# Patient Record
Sex: FEMALE | Race: WHITE | Employment: FULL TIME | URBAN - METROPOLITAN AREA
[De-identification: names, ages, dates, MRNs, and addresses within clinical notes are randomized per-mention and may not be internally consistent; named-entity substitution may affect disease eponyms.]

---

## 2016-01-20 LAB
CREATININE, EXTERNAL: 0.8
MICROALBUMIN UR TEST STR-MCNC: NORMAL MG/DL

## 2020-07-14 ENCOUNTER — DOCUMENTATION ONLY (OUTPATIENT)
Dept: ORTHOPEDIC SURGERY | Age: 55
End: 2020-07-14

## 2020-07-14 ENCOUNTER — OFFICE VISIT (OUTPATIENT)
Dept: ORTHOPEDIC SURGERY | Age: 55
End: 2020-07-14

## 2020-07-14 VITALS
RESPIRATION RATE: 16 BRPM | HEIGHT: 67 IN | HEART RATE: 70 BPM | TEMPERATURE: 97.5 F | SYSTOLIC BLOOD PRESSURE: 123 MMHG | DIASTOLIC BLOOD PRESSURE: 77 MMHG | WEIGHT: 177.6 LBS | BODY MASS INDEX: 27.88 KG/M2 | OXYGEN SATURATION: 99 %

## 2020-07-14 DIAGNOSIS — M19.172 POST-TRAUMATIC OSTEOARTHRITIS OF LEFT FOOT: ICD-10-CM

## 2020-07-14 DIAGNOSIS — M19.172 POST-TRAUMATIC OSTEOARTHRITIS OF LEFT ANKLE: Primary | ICD-10-CM

## 2020-07-14 DIAGNOSIS — M25.572 CHRONIC PAIN OF LEFT ANKLE: ICD-10-CM

## 2020-07-14 DIAGNOSIS — G89.29 CHRONIC PAIN OF LEFT ANKLE: ICD-10-CM

## 2020-07-14 NOTE — PROGRESS NOTES
Feeling better AMBULATORY PROGRESS NOTE      Patient: Maria Eugenia Harvey             MRN: [id number]     SSN: [id number] Body mass index is 27.82 kg/m². YOB: 1965     AGE: 47 y.o. EX: female    PCP: No primary care provider on file. IMPRESSION //  DIAGNOSIS AND TREATMENT PLAN      DIAGNOSES  1. Post-traumatic osteoarthritis of left ankle    2. Post-traumatic osteoarthritis of left foot    3. Chronic pain of left ankle        Orders Placed This Encounter    [18801] Ankle Min 3V     Order Specific Question:   Weight bearing? 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 She has limited motion, painful motion of the subtalar joint, holds her foot slightly everted and has tenderness the peroneal tendons as well. An MRI, as necessary, to assess the subtalar joint and the peroneal tendons on the posterior lateral portion of her left hindfoot. She also has, some mild plantar fasciitis as well. I am most concerned about the lateral hindfoot. PLAN:    1. I will order a left ankle MRI without contrast to further assess the peroneal tendons and subtalar joint    RTO- following MRI      HPI //  [facility] [address] José IS A 47 y.o. female who presents to my outpatient office for evaluation of: pain in the left heel. When she was younger she was very active and reports a history of multiple ankle sprains. She also used to play tennis and indicates an episode of ankle swelling years ago.  At that time she went to a doctor who indicated it was sprained and provided her with a brace. She was also told she has plantar fasciitis. She has used the brace ever since and reports her pain has progressively gotten worse overtime. She also experience start up pain and reports significant swelling by the end of the day. She has pain, also at night, when she sleeps in the scissored position, right side down left side up, she describes having pain, to the lateral and posterior lateral portion of left hindfoot at the peroneal tendons and subtalar joint regions. As a young child, she reports having frequent ankle sprains. Visit Vitals  /77 (BP 1 Location: Left arm, BP Patient Position: Sitting)   Pulse 70   Temp 97.5 °F (36.4 °C) (Oral)   Resp 16   Ht 5' 7\" (1.702 m)   Wt 177 lb 9.6 oz (80.6 kg)   SpO2 99%   BMI 27.82 kg/m²       ANKLE/FOOT left    Psychiatry: Alert, oriented x 3 (name,place,time of day); speech normal in context and clarity, memory intact grossly, no involuntary movements - tremors, no dementia  Gait: normal  Tenderness: moderate tenderness over the inferior medial plantar fasciitis, posterior lateral ankle, and subtalar joint. Significant tenderness of the peroneal tendons in the retro fibula groove. She is tenderness the subtalar joint laterally, does not want a fully invert, has incomplete motion, and I could not really get her towards to go beyond neutral, because of really exquisite tenderness in the subtalar joint. Cutaneous: some mild to moderate effusion of the left ankle specifically, at the peroneal tendons posterior laterally  Joint Motion: significant pain with attempted eversion  Joint / Tendon Stability: No Ankle or Subtalar instability or joint laxity. No peroneal sublux ability or dislocation  Alignment: Normal alignment is seen just with slight eversion at the hindfoot. When she standing.   Neuro Motor/Sensory: NL/NL,  Vascular: NL foot/ankle pulses,   Lymphatics: No extremity lymphedema, No calf swelling, no tenderness to calf muscles. CHART REVIEW     There is no problem list on file for this patient. Valerie Coats has been experiencing pain and discomfort confirmed as outlined in the pain assessment outlined below. Pain Assessment  7/14/2020   Location of Pain Ankle   Location Modifiers Left   Severity of Pain 4   Quality of Pain Throbbing; Sharp;Dull;Aching;Burning   Quality of Pain Comment sweeling   Duration of Pain A few hours   Frequency of Pain Intermittent   Aggravating Factors Walking;Standing;Stairs   Relieving Factors Ice;Rest   Result of Injury No        Precious Vega  has no past medical history on file. 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 History reviewed. No pertinent past medical history. History reviewed. No pertinent surgical history. No current outpatient medications on file. No current facility-administered medications for this visit. Allergies   Allergen Reactions    Codeine Unknown (comments)    Shrimp Not Reported This Time     Shrimp shells is what the patient is allergic to       Social History     Occupational History    Not on file   Tobacco Use    Smoking status: Never Smoker    Smokeless tobacco: Never Used   Substance and Sexual Activity    Alcohol use: Not on file    Drug use: Not on file    Sexual activity: Not on file     History reviewed. No pertinent family history. THE  FOR Haydee Marc MD 7/14/2020 . DIAGNOSTIC IMAGING  LAB DATA      No results found for: HBA1C, HGBE8, LDE1NGTA, GXC6ZFEB // No results found for: GLU, GLUCPOC     No results found for: DVY2KMVX, MQO2HZZX      No results found for: VITD3, XQVID2, XQVID3, XQVID, VD3RIA, ZQSJ31OFRUA      REVIEW OF SYSTEMS : 7/14/2020  ALL BELOW ARE Negative except : SEE HPI        CONSTITUTIONAL: No weight loss.    PSYCHOLOGICAL : No Feelings of anxiety, depression, agitation  EYES: No blurred vision and no eye discharge. NO eye pain, double vision  ENT: No nasal discharge. No ear pain. CARDIOVASCULAR: No chest pain and no diaphoresis. RESPIRATORY: No cough, no hemoptysis. GI: No vomiting, no diarrhea   : No urinary frequency and no dysuria. MUSCULOSKELETAL: see HPI  SKIN: No rashes. NEURO:  No dizziness,weakness, headaches// No visual changes or confusion, or seizures,   ENDOCRINE: No polyphagia and no polydipsia. HEMATOLOGY: No bleeding tendencies. DIAGNOSTIC IMAGING        ANKLE X RAYS 3 VIEWS Left   X RAYS AT [facility]  7/14/2020    NON WEIGHT BEARING    X RAYS AT [address] [place] [address]  7/14/2020    Bones: No fractures or dislocations. No focal osteolytic or osteoblastic process     Bone Spurs: No significant bone spurs  Alignment: Ankle mortise alignment is congruent, Tibial plafond and talar dome intact. No Osteochondral defects seen   Joint: The subtalar joint, is not well visualized, there are some joint space narrowing, and I suspect some OA, of the left subtalar joint. There may be a subtalar joint coalition, as well. Her fibula slightly shortened and the lateral process of her talus, extends outward, and is remains more prominent or more protruding. Soft Tissues: Normal, No radiopaque foreign body     No abnormal calcific densities to soft tissues    No ankle joint effusion in lateral projection. Mineralization: Suggests no Osteopenia    I have personally reviewed the results of the above study. The interpretation of this study is my professional opinion     . Otoniel Pastor MD  7/14/2020  7:24 AM       Disclaimer: Sections of this note are dictated using utilizing voice recognition software, which may have resulted in some phonetic based errors in grammar and contents. Even though attempts were made to correct all the mistakes, some may have been missed, and remained in the body of the document. If questions arise, please contact our department.

## 2020-07-14 NOTE — PROGRESS NOTES
Patients order, office notes and demographics were faxed to [facility] [place] requesting an appt per patients request   Tel# [phone] fax# [phone] (radiology dept)

## 2020-07-28 ENCOUNTER — CLINICAL SUPPORT (OUTPATIENT)
Dept: FAMILY MEDICINE CLINIC | Age: 55
End: 2020-07-28
Payer: COMMERCIAL

## 2020-07-28 VITALS — SYSTOLIC BLOOD PRESSURE: 112 MMHG | HEART RATE: 54 BPM | DIASTOLIC BLOOD PRESSURE: 73 MMHG | TEMPERATURE: 97.5 F

## 2020-07-28 DIAGNOSIS — J30.2 SEASONAL ALLERGIC RHINITIS, UNSPECIFIED TRIGGER: Primary | ICD-10-CM

## 2020-07-28 DIAGNOSIS — Z51.6 DESENSITIZATION TO ALLERGENS: ICD-10-CM

## 2020-07-28 PROCEDURE — 95117 IMMUNOTHERAPY INJECTIONS: CPT | Performed by: NURSE PRACTITIONER

## 2020-07-28 NOTE — PROGRESS NOTES
Patient presented to office today for allergy injection. Patient stated She  did not have any adverse reaction to two months ago. Patient received 0.5ml of allergy IM to bilateral subcutaneous deltoid. She tolerated procedure well. Patient left office with out waiting the 15 minutes for us to observe. Lot Number [id number]  Expires 10/4/2020   Manufacture: mixed and ordered by Dr. Magda Calderon  NDC: Mixed and ordered by Dr. Magda Calderon  Dosage: 0.5   left Subcutaneous Deltoid      Lot Number [id number]  Expires 10/4/2020   Manufacture: mixed and ordered by Dr. Magda Calderon  NDC: Mixed and ordered by Dr. Magda Calderon  Dosage: 0.5  Right Subcutaneous Deltoid    Order placed for allergy injections (monthly), # 0.5 ml x 2, sub deltoid, per Verbal Order from [facility] on 7/28/2020 due to Seasonal Allergies.

## 2020-07-29 DIAGNOSIS — G89.29 CHRONIC PAIN OF LEFT ANKLE: ICD-10-CM

## 2020-07-29 DIAGNOSIS — M19.172 POST-TRAUMATIC OSTEOARTHRITIS OF LEFT ANKLE: ICD-10-CM

## 2020-07-29 DIAGNOSIS — M25.572 CHRONIC PAIN OF LEFT ANKLE: ICD-10-CM

## 2020-09-03 ENCOUNTER — CLINICAL SUPPORT (OUTPATIENT)
Dept: FAMILY MEDICINE CLINIC | Age: 55
End: 2020-09-03

## 2020-09-03 VITALS
SYSTOLIC BLOOD PRESSURE: 118 MMHG | DIASTOLIC BLOOD PRESSURE: 77 MMHG | HEART RATE: 78 BPM | OXYGEN SATURATION: 100 % | BODY MASS INDEX: 27 KG/M2 | TEMPERATURE: 98 F | HEIGHT: 67 IN | WEIGHT: 172 LBS

## 2020-09-03 DIAGNOSIS — J30.2 SEASONAL ALLERGIC RHINITIS, UNSPECIFIED TRIGGER: Primary | ICD-10-CM

## 2020-09-14 ENCOUNTER — OFFICE VISIT (OUTPATIENT)
Dept: ORTHOPEDIC SURGERY | Age: 55
End: 2020-09-14

## 2020-09-14 VITALS
WEIGHT: 176.6 LBS | SYSTOLIC BLOOD PRESSURE: 114 MMHG | BODY MASS INDEX: 27.72 KG/M2 | RESPIRATION RATE: 18 BRPM | HEIGHT: 67 IN | OXYGEN SATURATION: 98 % | DIASTOLIC BLOOD PRESSURE: 78 MMHG | HEART RATE: 72 BPM | TEMPERATURE: 98.6 F

## 2020-09-14 DIAGNOSIS — M19.172 POST-TRAUMATIC OSTEOARTHRITIS OF LEFT ANKLE: Primary | ICD-10-CM

## 2020-09-14 RX ORDER — ACETAMINOPHEN/DIPHENHYDRAMINE 500MG-25MG
TABLET ORAL
COMMUNITY

## 2020-09-14 NOTE — PATIENT INSTRUCTIONS
You have been provided with an order for durable medical equipment that you may  at an outside facility as our office does not carry the equipment you need. You may pick it up at any medical supply company you like. Listed below are a few different locations for your convenience: 
 
[address] [facility] Phone: [phone] [place]:  
[address] [facility], [address] [place], [address] [place]: 
Joselin Nowak [address] [place]: 
[address] [facility], [address] [employer] Address: [address], [place], [address] Phone: [phone] Look into [employer] 928 shoes as these shoes fit well with the [facility]

## 2020-09-14 NOTE — PROGRESS NOTES
Feeling better AMBULATORY PROGRESS NOTE      Patient: Yoandy Pitts             MRN: [id number]     SSN: [id number] Body mass index is 27.66 kg/m². YOB: 1965     AGE: 54 y.o. EX: female    PCP: Tiff Hall NP       IMPRESSION //  DIAGNOSIS AND TREATMENT PLAN      DIAGNOSES  1. Post-traumatic osteoarthritis of left ankle        Orders Placed This Encounter    Generic Supply Order     custom L extended SMO. Mild medial posting    diphenhydrAMINE-acetaminophen (Tylenol PM Extra Strength)  mg tab     Sig: Take  by mouth.  MELATONIN PO     Sig: Take  by mouth. 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 There are some OA changes seen across her ankle joint, as well. She had a MRI, left ankle, at [facility] [place], as this was conducted on July 25, 2020. This study showed small ankle effusion, partial-thickness chondral loss is noted across the medial and lateral talar shoulders with small subchondral bone marrow edema. No fractures no contusions. Is intact talar dome symmetric ankle mortise. The hindfoot, however showed advanced chondral loss across the posterior facet subtalar joint with with associated degenerative changes. There is accompanied subchondral marrow bone marrow edema at the subtalar joint posterior facet. The small subchondral cystic changes seen also across the subtalar region. No evidence of subtalar coalition. The midfoot, show no fracture no contusion there is mild chondral loss across the tarsometatarsal joint articulations. The remaining TMT joints were normal in appearance. The sinus Tarsi, showed subtotal replacement of normal fat within the sinus Tarsi, plantar fascia was of normal caliber and signal, the tarsal tunnel was normal in appearance, the regional soft tissues are also within normal limits no abnormal fluid collections. There is a prominent os trigonum. Impression was advanced arthritic change across the left left subtalar joint and to lesser extent the medial lateral shoulders of the the left ankle tib talar joint articulation with intact talar dome and evidence of prior ankle sprains. Small ankle effusion. Treatment options include surgical //  a subtalar joint arthrodesis bone grafting extra bone stimulator. The nonsurgical treatment options, would include a brace: An extended SMO brace to control her subtalar region. She did not want surgical invention, at this current time. So we will try the brace. I would recommend a repeat cortisone injections, in this region, although cortisone injection singular course injection may provide her some benefit may buy her some time but eventually things require subtalar arthrodesis which explained her she understands this. Sang Quintero MD, has ordered a custom brace or DME product for you. This will be customized and made for you by an outside facility. I am requesting that you contact the Orthotist company provided below in order to have the prescription made. Zachary Justice is in need of CUSTOM   Left     1. Unilateral, extended,  MEDIAL POSTED, SMO Left    2. GOAL OF TREATMENT TO: to provide M/L stability, optimal arch support, and limit ML/AP motion. Zachary Justice needs tri planar control (Medial / Lateral stability, optimal arch support, and limited Medial/Lateral // Anterior/Posterior Motion) of the foot and ankle in order to improve anatomical alignment, protect/control motion, and to relieve pain. PLAN:    1. DME order: custom L extended SMO. Mild medial posting ()  2. Look into New Balance 928 shoes as these shoes fit well with the SMO    RTO-  6 weeks      HPI //  Mckinley Eusebio Ulloa IS A 54 y.o. female who presents to my outpatient office for follow up evaluation of: Post-traumatic osteoarthritis of left ankle.  At the last OV, I ordered an MRI of the left ankle to assess peroneal tendons. Since the last Rissa Claudia continues to endorse the same persistent pain. 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     Visit Vitals  /78 (BP 1 Location: Left arm, BP Patient Position: Sitting)   Pulse 72   Temp 98.6 °F (37 °C)   Resp 18   Ht 5' 7\" (1.702 m)   Wt 176 lb 9.6 oz (80.1 kg)   SpO2 98%   BMI 27.66 kg/m²       ANKLE/FOOT left    Psychiatry: Alert, oriented x 3 (name,place,time of day); speech normal in context and clarity, memory intact grossly, no involuntary movements - tremors, no dementia  Gait: normal  Tenderness: moderate tenderness posterior facet subtalar joint. Slight tenderness of the peroneal tendons in the retro fibula groove. She is tenderness the subtalar joint laterally, does not want a fully invert, has incomplete motion, and I could not really get her towards to go beyond neutral, because of really exquisite tenderness in the subtalar joint. Cutaneous: some mild to moderate effusion of the left ankle specifically, at the peroneal tendons posterior laterally  Joint Motion: significant pain with attempted eversion  Joint / Tendon Stability: No Ankle or Subtalar instability or joint laxity. No peroneal sublux ability or dislocation  Alignment: Normal alignment is seen just with slight eversion at the hindfoot. When she standing. Neuro Motor/Sensory: NL/NL,  Vascular: NL foot/ankle pulses,   Lymphatics: No extremity lymphedema, No calf swelling, no tenderness to calf muscles. CHART REVIEW     Patient Active Problem List   Diagnosis Code    Seasonal allergic rhinitis J30.2        Chantel Watts has been experiencing pain and discomfort confirmed as outlined in the pain assessment outlined below. Pain Assessment  9/14/2020   Location of Pain Ankle   Location Modifiers Right   Severity of Pain 0   Quality of Pain Sharp; Throbbing   Quality of Pain Comment - Duration of Pain Persistent   Duration of Pain Comment when walking pain is persistent   Frequency of Pain Constant   Date Pain First Started (No Data)   Aggravating Factors Walking;Stairs   Limiting Behavior Yes   Relieving Factors Rest;NSAID   Result of Injury No        Precious Vega  has no past medical history on file. 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 History reviewed. No pertinent past medical history. History reviewed. No pertinent surgical history. Current Outpatient Medications   Medication Sig    diphenhydrAMINE-acetaminophen (Tylenol PM Extra Strength)  mg tab Take  by mouth.  MELATONIN PO Take  by mouth. No current facility-administered medications for this visit. Allergies   Allergen Reactions    Codeine Unknown (comments)    Shrimp Not Reported This Time     Shrimp shells is what the patient is allergic to       Social History     Occupational History    Not on file   Tobacco Use    Smoking status: Never Smoker    Smokeless tobacco: Never Used   Substance and Sexual Activity    Alcohol use: Yes     Alcohol/week: 1.0 standard drinks     Types: 1 Glasses of wine per week     Comment: occasionally    Drug use: Never    Sexual activity: Not on file     Family History   Problem Relation Age of Onset    Diabetes Mother     Hypertension Mother    Batista Obesity Mother     COPD Father     Cancer Father     Hypertension Father        THE  Ke Appiah MD 9/14/2020 . DIAGNOSTIC IMAGING  LAB DATA      No results found for: HBA1C, HGBE8, DRN0FEKR, ZQB6NWBZ // No results found for: GLU, GLUCPOC     No results found for: RXM4CQUD, IHL4AFSI      No results found for: VITD3, XQVID2, XQVID3, XQVID, VD3RIA, [id number]      REVIEW OF SYSTEMS : 9/14/2020  ALL BELOW ARE Negative except : SEE HPI        CONSTITUTIONAL: No weight loss.    PSYCHOLOGICAL : No Feelings of anxiety, depression, agitation  EYES: No blurred vision and no eye discharge. NO eye pain, double vision  ENT: No nasal discharge. No ear pain. CARDIOVASCULAR: No chest pain and no diaphoresis. RESPIRATORY: No cough, no hemoptysis. GI: No vomiting, no diarrhea   : No urinary frequency and no dysuria. MUSCULOSKELETAL: see HPI  SKIN: No rashes. NEURO:  No dizziness,weakness, headaches// No visual changes or confusion, or seizures,   ENDOCRINE: No polyphagia and no polydipsia. HEMATOLOGY: No bleeding tendencies. DIAGNOSTIC IMAGING          No notes on file    Please see above section of this report. I have personally reviewed the results of the above study. The interpretation of this study is my professional opinion. Written by [facility] , as dictated by Dr. Rosie Jackson. I, Dr. Rosie Jackson, confirm that all documentation is accurate.

## 2020-09-29 VITALS
OXYGEN SATURATION: 99 % | SYSTOLIC BLOOD PRESSURE: 129 MMHG | HEART RATE: 75 BPM | RESPIRATION RATE: 16 BRPM | HEIGHT: 67 IN | DIASTOLIC BLOOD PRESSURE: 83 MMHG

## 2020-09-29 RX ORDER — CYCLOBENZAPRINE HCL 10 MG
TABLET ORAL
COMMUNITY

## 2020-10-06 ENCOUNTER — CLINICAL SUPPORT (OUTPATIENT)
Dept: FAMILY MEDICINE CLINIC | Age: 55
End: 2020-10-06
Payer: COMMERCIAL

## 2020-10-06 VITALS
SYSTOLIC BLOOD PRESSURE: 121 MMHG | DIASTOLIC BLOOD PRESSURE: 83 MMHG | BODY MASS INDEX: 26.93 KG/M2 | HEIGHT: 67 IN | HEART RATE: 92 BPM | OXYGEN SATURATION: 98 % | TEMPERATURE: 97.3 F | WEIGHT: 171.6 LBS

## 2020-10-06 DIAGNOSIS — Z51.6 DESENSITIZATION TO ALLERGENS: ICD-10-CM

## 2020-10-06 DIAGNOSIS — J30.9 ALLERGIC RHINITIS, UNSPECIFIED SEASONALITY, UNSPECIFIED TRIGGER: ICD-10-CM

## 2020-10-06 PROCEDURE — 95117 IMMUNOTHERAPY INJECTIONS: CPT | Performed by: FAMILY MEDICINE

## 2020-10-06 NOTE — PROGRESS NOTES
Patient presented to office today for allergy injection. Patient stated She  did not have any adverse reaction to last injection on 9/3/2020. Patient received 1ml of allergy IM to bilateral subcutaneous deltoid. She tolerated procedure well. Patient refused to stay in the office for observation, was in no distress when they left. RX/Lot Number [id number]  Expires 10/04/2020 - patient stated that Dr. Munoz [place] office knew her medication was  but stated okay to give. Mixed Product by: Dr. Leslie Douglass  Dosage: 0.5   left Subcutaneous deltoid    RX/Lot Number [id number]  Expires 10/04/2020 - patient stated that Dr. Munoz [place] office knew her medication was  but stated okay to give   Mixed Product by: Dr. Leslie Douglass  Dosage: 0.5 ml   right Subcutaneous deltoid    Verbal Order from Dr. Eduardo Decker on 10/6/2020 due to allergies.

## 2020-10-06 NOTE — PATIENT INSTRUCTIONS
Antígeno de la alergia (Por inyección) Se [place] para tratar Samule Mail al hacer que montgomery cuerpo sea menos sensible a la sustancia a la cual usted es alérgico. Después de usar montse medicamento sreekanth cierto tiempo, es posible que usted no presente jennifer reacción alérgica juanita, si la presenta, esta reacción será menos severa. Montse [employer] se [place] para hacer pruebas de alergia. Gricelda(s) : Hymenoptera Venom Diagnostic Kit, Mixed Vespid Treatment, Wasp Treatment, Yellow Hornet Treatment, Yellow Jacket Treatment Existen muchas otras marcas de Dueñas. Montse medicamento no debe ser usado cuando:  
Usted no debe usar montse medicamento si no puede cumplir las reglas de montgomery plan de noble. Es necesario que usted pueda lidiar con los efectos secundarios de montse medicamento y [employer] problemas a montgomery médico. Es posible que no pueda usar montse medicamento si usted tiene ciertos problemas de [place]. No olvide informarle a montgomery médico, si usted tiene jennifer enfermedad severa en el corazón o problemas con montgomery sistema inmunológico. Tampoco olvide informarle, si [employer] un beta bloqueador, bora atenolol, carvedilol, labetalol, metoprolol, nadolol, propranolol, timolol, Blocadren®, Coreg®, Inderal®, Lopressor®, Tenormin® o Toprol®. Forma de usar montse medicamento:  
Inyectable · Antes de comenzar el tratamiento con montse medicamento, usted debe hacerse pruebas de alergia en montgomery piel. · Montgomery médico le recetará montgomery dosis exacta y le indicará la frecuencia con la que debe administrarse. Montse medicamento se administra en forma de inyección inmediatamente debajo de la piel. Montse medicamento es usualmente aplicado en la parte superior de montgomery brazo. · Sandre Corns u otro médico le administrará montse medicamento.  
· Si sufre de [facility] a más de [place], fifi puede necesitar más de jennifer inyección en cada visita que fifi skyla al consultorio de montgomery médico o clínica. Puede ser necesario que usted espere 30 minutos entre jennifer inyección y [place]. · Necesitará permanecer allí hasta 30 minutos después que le apliquen la inyección. · Antes de recibir jennifer inyección, infórmele siempre a montgomery médico lo siguiente: ¨ Si usted aquino tenido [place] reacción a la inyección para la alergia que le aplicaron antes. ¨ Si recientemente usted ha omitido alguna inyección. ¨ Si está presentando síntomas de [place], aún de jennifer alergia que no skyla parte de [place]. ¨ Si usted está enfermo o tiene Francis Edmonds, juanita en especial, si también tiene fiebre. ¨ Si recientemente, usted ha sido nain por algún insecto. · Usted recibirá lentamente dosis cada vez más grandes hasta que llegue a la dosis de sostenimiento. Jennifer vez que haya llegado a esta dosis de sostenimiento, usted comenzará a necesitar inyecciones con menos frecuencia. Muchas personas continúan recibiendo [employer] dosis de sostenimiento. Si jennifer dosis es olvidada:  
· Llame a montgomery médico o a la clínica donde recibe el tratamiento para solicitar instrucciones. · Lindsay medicamento debe aplicarse frecuentemente. Si omite muchas citas sreekanth el tratamiento, usted se demorará más tiempo en terminar montgomery tratamiento. Medicamentos y [employer] que debe evitar:  
Consulte con montgomery médico o farmacéutico antes de usar cualquier medicamento, incluyendo los que compra sin receta médica, las vitaminas y los productos herbales. · Tampoco olvide informarle, si [employer] un beta bloqueador, bora atenolol, carvedilol, labetalol, metoprolol, nadolol, propranolol, timolol, Blocadren®, Coreg®, Inderal®, Lopressor®, Tenormin® o Toprol®. Los beta bloqueadores pueden usarse para tratar ciertas condiciones, incluyendo presión arterial kelsie, problemas en el corazón o migrañas (jaquecas). Precauciones sreekanth el uso de [place] medicamento: · Asegúrese de informar a montgomery médico si usted está embarazada o amamantando. · Lindsay medicamento contiene un alergeno (sustancia a la cual usted es alérgico). A usted le administrarán lindsay alergeno en pequeñas cantidades, de nolan forma que montgomery cuerpo se acostumbrará poco a poco a esta sustancia. [place] significa que usted tendrá muy poco riesgo de presentar jennifer reacción alérgica al medicamento. Antes que usted comience con el tratamiento, hable con montgomery médico acerca de [employer]. · Aprenda a conocer los signos y síntomas de Lorel Pickler reacción alérgica que necesita ser tratada. Infórmele a montgomery médico, cada vez que piense que usted está presentando jennifer reacción alérgica severa a lindsay medicamento. Algunos de los signos de jennifer reacción alérgica severa, pueden ser los siguientes: ¨ Mareos, desvanecimientos o desmayo. ¨ Latido cardíaco acelerado, lento, o violento. ¨ Ronchas, picazón o salpullido en la piel. ¨ Estornudos, tos, ojos llorosos, flujo nasal o nariz congestionada. ¨ Hinchazón alrededor de montgomery boca o dentro de montgomery boca o garganta. ¨ Sibilancias u otros problemas respiratorios. · Consulte con montgomery médico sobre lo que debería hacer, si después de salir del consultorio de montgomery médico, usted presenta jennifer reacción alérgica. Algunas personas necesitan llevar siempre un medicamento especial (bora epinefrina, EpiPen®), para tratarse ellas mismas, en angie que presenten jennifre reacción alérgica. · Jcaque todas las personas presentan enrojecimiento, calor, dolor leve o hinchazón moderada en el sitio donde fue aplicada la inyección. Pregúntele a montgomery médico lo que usted debería hacer en angie de presentar esta reacción. Muchas personas se aplican hielo o rhett un antihistamínico, juanita montgomery situación podría ser diferente. · Un número reducido de personas presentará algún tipo de reacción alérgica, aún después de hina sido tratadas con lindsay medicamento. Sin embargo, esta reacción alérgica puede ser menos severa y más fácil de tratar de lo que hubiera sido antes del tratamiento. Efectos secundarios que pueden presentarse sreekanth el uso de montse medicamento:  
Consulte inmediatamente con el médico si nota cualquiera de estos efectos secundarios: 
· Reacción alérgica: Comezón o ronchas, hinchazón del aminah o las noe, hinchazón u hormigueo en la boca o garganta, opresión en el pecho, dificultad para respirar · Mareos, desvanecimientos o desmayo. · Latido cardíaco acelerado, lento, o violento. Consulte con el médico si nota otros efectos secundarios que janis son causados por montse medicamento. Llame a montgomery médico para consultarle Analisa. Usted puede notificar herve efectos secundarios al FDA al [phone]. © 2017 [facility] Information is for End User's use only and may not be sold, redistributed or otherwise used for commercial purposes. Esta información es sólo para uso en educación. Montgomery intención no es darle un consejo médico sobre enfermedades o tratamientos. Colsulte con montgomery Belenda Gun farmacéutico antes de seguir cualquier régimen médico para saber si es seguro y efectivo para usted.

## 2021-01-25 ENCOUNTER — OFFICE VISIT (OUTPATIENT)
Dept: ORTHOPEDIC SURGERY | Age: 56
End: 2021-01-25
Payer: COMMERCIAL

## 2021-01-25 VITALS
DIASTOLIC BLOOD PRESSURE: 67 MMHG | HEIGHT: 67 IN | HEART RATE: 71 BPM | WEIGHT: 177 LBS | TEMPERATURE: 98 F | OXYGEN SATURATION: 96 % | BODY MASS INDEX: 27.78 KG/M2 | SYSTOLIC BLOOD PRESSURE: 109 MMHG | RESPIRATION RATE: 16 BRPM

## 2021-01-25 DIAGNOSIS — M19.172 POST-TRAUMATIC OSTEOARTHRITIS OF LEFT FOOT: ICD-10-CM

## 2021-01-25 DIAGNOSIS — M19.172 POST-TRAUMATIC OSTEOARTHRITIS OF LEFT ANKLE: Primary | ICD-10-CM

## 2021-01-25 PROCEDURE — 99213 OFFICE O/P EST LOW 20 MIN: CPT | Performed by: ORTHOPAEDIC SURGERY

## 2021-01-25 NOTE — PROGRESS NOTES
Feeling better AMBULATORY PROGRESS NOTE      Patient: Jose Patterson             MRN: [id number]     SSN: [id number] Body mass index is 27.72 kg/m². YOB: 1965     AGE: 54 y.o. EX: female    PCP: Martha Kumar NP       IMPRESSION //  DIAGNOSIS AND TREATMENT PLAN      DIAGNOSES  1. Post-traumatic osteoarthritis of left ankle    2. Post-traumatic osteoarthritis of left foot        No orders of the defined types were placed in this encounter. PLAN:    1. Continue wearing custom SMO in tennis shoes    RTO-  4 months, as she is doing a much better at this point in time. She has had numerous adjustments, to orthotics and she is quite pleased with the with the attention to detail from Sharon Dave at [address] and prosthetics. HPI //  Mckinley Eusebio Ulloa IS A 54 y.o. female who presents t  my outpatient office for follow up evaluation of: Post-traumatic osteoarthritis of left ankle. At the last OV, I wrote a Rx for a custom L extended SMO with mild medial posting and encouraged pt to look into New Balance 928 shoes. Since the last OV, Jose Patterson reports no major pain in her left ankle since she started wearing the custom SMO. She continues to endorse persistent swelling, but otherwise she is able to walk better with less pain. She admits that is  not 100% but is able to perform most of her normal activities at about 50% capacity. Occasionally she will have sharp arthritic pain at night. She finds comfort with wearing croc-style shoes around the house and without the brace and has no major pain.       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     Visit Vitals  /67 (BP 1 Location: Right arm, BP Patient Position: Sitting)   Pulse 71   Temp 98 °F (36.7 °C) (Temporal)   Resp 16   Ht 5' 7\" (1.702 m)   Wt 177 lb (80.3 kg)   SpO2 96%   BMI 27.72 kg/m²       ANKLE/FOOT left    Psychiatry: Alert, oriented x 3 (name,place,time of day); speech normal in context and clarity, memory intact grossly, no involuntary movements - tremors, no dementia  Gait: normal  Tenderness: mild tenderness posterior facet subtalar joint. Slight tenderness of the peroneal tendons in the retro fibula groove. She is tenderness the subtalar joint laterally, does not want a fully invert, has incomplete motion, and I could not really get her towards to go beyond neutral, because of really exquisite tenderness in the subtalar joint. Cutaneous: some mild swelling specifically, at the peroneal tendons posterior laterally  Joint Motion: significant pain with attempted eversion  Joint / Tendon Stability: No Ankle or Subtalar instability or joint laxity. No peroneal sublux ability or dislocation  Alignment: Normal alignment is seen just with slight eversion at the hindfoot. When she standing. Neuro Motor/Sensory: NL/NL,  Vascular: NL foot/ankle pulses,   Lymphatics: No extremity lymphedema, No calf swelling, no tenderness to calf muscles. CHART REVIEW     Patient Active Problem List   Diagnosis Code    Seasonal allergic rhinitis J30.2        Drew Smith has been experiencing pain and discomfort confirmed as outlined in the pain assessment outlined below. Pain Assessment  1/25/2021   Location of Pain Ankle   Location Modifiers Left   Severity of Pain 0   Quality of Pain -   Quality of Pain Comment -   Duration of Pain -   Duration of Pain Comment -   Frequency of Pain -   Date Pain First Started -   Aggravating Factors -   Limiting Behavior No   Relieving Factors -   Result of Injury No        Precious Vega  has no past medical history on file. 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 History reviewed. No pertinent past medical history. History reviewed. No pertinent surgical history. Current Outpatient Medications   Medication Sig    MELATONIN PO Take  by mouth.     cyclobenzaprine (FLEXERIL) 10 mg tablet Take  by mouth three (3) times daily as needed for Muscle Spasm(s).  diphenhydrAMINE-acetaminophen (Tylenol PM Extra Strength)  mg tab Take  by mouth. No current facility-administered medications for this visit. Allergies   Allergen Reactions    Codeine Unknown (comments)    Shrimp Not Reported This Time     Shrimp shells is what the patient is allergic to       Social History     Occupational History    Not on file   Tobacco Use    Smoking status: Never Smoker    Smokeless tobacco: Never Used   Substance and Sexual Activity    Alcohol use: Yes     Alcohol/week: 1.0 standard drinks     Types: 1 Glasses of wine per week     Comment: occasionally    Drug use: Never    Sexual activity: Not on file     Family History   Problem Relation Age of Onset    Diabetes Mother     Hypertension Mother     Obesity Mother     COPD Father     Cancer Father     Hypertension Father     Immunodeficiency Father         ALS       THE  FOR Maria Teresa Tariq MD 1/25/2021 . DIAGNOSTIC IMAGING  LAB DATA      No results found for: HBA1C, HGBE8, BGJ6RBVI, BJJ4VQZY // No results found for: GLU, GLUCPOC     No results found for: YKK2HAQR, HXC6CJYH      No results found for: VITD3, XQVID2, XQVID3, XQVID, VD3RIA, [id number]      REVIEW OF SYSTEMS : 1/25/2021  ALL BELOW ARE Negative except : SEE HPI        CONSTITUTIONAL: No weight loss. PSYCHOLOGICAL : No Feelings of anxiety, depression, agitation  EYES: No blurred vision and no eye discharge. NO eye pain, double vision  ENT: No nasal discharge. No ear pain. CARDIOVASCULAR: No chest pain and no diaphoresis. RESPIRATORY: No cough, no hemoptysis. GI: No vomiting, no diarrhea   : No urinary frequency and no dysuria. MUSCULOSKELETAL: see HPI  SKIN: No rashes. NEURO:  No dizziness,weakness, headaches// No visual changes or confusion, or seizures,   ENDOCRINE: No polyphagia and no polydipsia.    HEMATOLOGY: No bleeding tendencies. DIAGNOSTIC IMAGING      No notes on file    Please see above section of this report. I have personally reviewed the results of the above study. The interpretation of this study is my professional opinion. Written by Jobie Baumgarten, as dictated by Dr. Gris Hooper. I, Dr. Gris Hooper, confirm that all documentation is accurate.

## 2021-06-08 ENCOUNTER — OFFICE VISIT (OUTPATIENT)
Dept: ORTHOPEDIC SURGERY | Age: 56
End: 2021-06-08
Payer: COMMERCIAL

## 2021-06-08 VITALS
WEIGHT: 177 LBS | HEART RATE: 79 BPM | TEMPERATURE: 97 F | BODY MASS INDEX: 27.78 KG/M2 | HEIGHT: 67 IN | OXYGEN SATURATION: 96 %

## 2021-06-08 DIAGNOSIS — M19.172 POST-TRAUMATIC OSTEOARTHRITIS OF LEFT FOOT: ICD-10-CM

## 2021-06-08 DIAGNOSIS — M19.172 POST-TRAUMATIC OSTEOARTHRITIS OF LEFT ANKLE: Primary | ICD-10-CM

## 2021-06-08 DIAGNOSIS — M17.11 ARTHRITIS OF RIGHT KNEE: ICD-10-CM

## 2021-06-08 PROCEDURE — 99213 OFFICE O/P EST LOW 20 MIN: CPT | Performed by: ORTHOPAEDIC SURGERY

## 2021-06-08 RX ORDER — ACETAMINOPHEN 500 MG
TABLET ORAL
COMMUNITY

## 2021-06-08 RX ORDER — IBUPROFEN 200 MG
CAPSULE ORAL AS NEEDED
COMMUNITY

## 2021-06-08 NOTE — PATIENT INSTRUCTIONS
Knee: Exercises Introduction Here are some examples of exercises for you to try. The exercises may be suggested for a condition or for rehabilitation. Start each exercise slowly. Ease off the exercises if you start to have pain. You will be told when to start these exercises and which ones will work best for you. How to do the exercises [facility] 1. Sit with your leg straight and supported on the floor or a firm bed. (If you feel discomfort in the front or back of your knee, place a small towel roll under your knee.) 2. Tighten the muscles on top of your thigh by pressing the back of your knee flat down to the floor. (If you feel discomfort under your kneecap, place a small towel roll under your knee.) 3. Hold for about 6 seconds, then rest for up to 10 seconds. 4. Do 8 to 12 repetitions several times a day. Straight-leg raises to the front 1. Lie on your back with your good knee bent so that your foot rests flat on the floor. Your injured leg should be straight. Make sure that your low back has a normal curve. You should be able to slip your flat hand in between the floor and the small of your back, with your palm touching the floor and your back touching the back of your hand. 2. Tighten the thigh muscles in the injured leg by pressing the back of your knee flat down to the floor. Hold your knee straight. 3. Keeping the thigh muscles tight, lift your injured leg up so that your heel is about 12 inches off the floor. Hold for about 6 seconds and then lower slowly. 4. Do 8 to 12 repetitions, 3 times a day. Straight-leg raises to the outside 1. Lie on your side, with your injured leg on top. 2. Tighten the front thigh muscles of your injured leg to keep your knee straight. 3. Keep your hip and your leg straight in line with the rest of your body, and keep your knee pointing forward. Do not drop your hip back.  
4. Lift your injured leg straight up toward the ceiling, about 12 inches off the floor. Hold for about 6 seconds, then slowly lower your leg. 5. Do 8 to 12 repetitions. Straight-leg raises to the back 1. Lie on your stomach, and lift your leg straight up behind you (toward the ceiling). 2. Lift your toes about 6 inches off the floor, hold for about 6 seconds, then lower slowly. 3. Do 8 to 12 repetitions. Straight-leg raises to the inside 1. Lie on the side of your body with the injured leg. 2. You can either prop your other (good) leg up on a chair, or you can bend your good knee and put that foot in front of your injured knee. Do not drop your hip back. 3. Tighten the muscles on the front of your thigh to straighten your injured knee. 4. Keep your kneecap pointing forward, and lift your whole leg up toward the ceiling about 6 inches. Hold for about 6 seconds, then lower slowly. 5. Do 8 to 12 repetitions. Heel dig bridging 1. Lie on your back with both knees bent and your ankles bent so that only your heels are digging into the floor. Your knees should be bent about 90 degrees. 2. Then push your heels into the floor, squeeze your buttocks, and lift your hips off the floor until your shoulders, hips, and knees are all in a straight line. 3. Hold for about 6 seconds as you continue to breathe normally, and then slowly lower your hips back down to the floor and rest for up to 10 seconds. 4. Do 8 to 12 repetitions. Hamstring curls 1. Lie on your stomach with your knees straight. If your kneecap is uncomfortable, roll up a washcloth and put it under your leg just above your kneecap. 2. Lift the foot of your injured leg by bending the knee so that you bring the foot up toward your buttock. If this motion hurts, try it without bending your knee quite as far. This may help you avoid any painful motion. 3. Slowly lower your leg back to the floor. 4. Do 8 to 12 repetitions.  
5. With permission from your doctor or physical therapist, you may also want to add a cuff weight to your ankle (not more than 5 pounds). With weight, you do not have to lift your leg more than 12 inches to get a hamstring workout. Shallow standing knee bends Do this exercise only if you have very little pain; if you have no clicking, locking, or giving way if you have an injured knee; and if it does not hurt while you are doing 8 to 12 repetitions. 1. Stand with your hands lightly resting on a counter or chair in front of you. Put your feet shoulder-width apart. 2. Slowly bend your knees so that you squat down like you are going to sit in a chair. Make sure your knees do not go in front of your toes. 3. Lower yourself about 6 inches. Your heels should remain on the floor at all times. 4. Rise slowly to a standing position. Heel raises 1. Stand with your feet a few inches apart, with your hands lightly resting on a counter or chair in front of you. 2. Slowly raise your heels off the floor while keeping your knees straight. 3. Hold for about 6 seconds, then slowly lower your heels to the floor. 4. Do 8 to 12 repetitions several times during the day. Follow-up care is a key part of your treatment and safety. Be sure to make and go to all appointments, and call your doctor if you are having problems. It's also a good idea to know your test results and keep a list of the medicines you take. Where can you learn more? Go to http://www.gray.com/ Enter L643 in the search box to learn more about \"Knee: Exercises. \" Current as of: November 16, 2020               Content Version: 12.8 © 2124-2563 [employer]. Care instructions adapted under license by [employer] (which disclaims liability or warranty for this information). If you have questions about a medical condition or this instruction, always ask your healthcare professional. [address] any warranty or liability for your use of this information.
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
## 2021-08-10 NOTE — PROGRESS NOTES
AMBULATORY PROGRESS NOTE      Patient: Paz Campos             MRN: [id number]     SSN: [id number] Body mass index is 27.41 kg/m². YOB: 1965     AGE: 64 y.o. EX: female    PCP: Fanta Denis NP       IMPRESSION //  DIAGNOSIS AND TREATMENT PLAN        Paz Campos has a diagnosis of:      DIAGNOSES    1. Post-traumatic osteoarthritis of left ankle    2. Post-traumatic osteoarthritis of left foot        No orders of the defined types were placed in this encounter. PLAN:    1. Recommended various exercises she can do  2. Continue wearing extended left SMO        RTO-  PRN    Paz Campos  expresses understanding of the diagnosis, treatment plan, and all of their proposed questions were answered to their satisfaction. Patient education has been provided re the diagnoses. [facility] //  [address] [place] [address] IS A 64 y.o. female who is a/an  established patient, presenting to my outpatient office for evaluation of  the following chief complaint(s):     Chief Complaint   Patient presents with    Ankle Pain     left ankle       At LOV presented w/ left ankle pain. Discussed multiple treatment options. Advised the PT to remain cautuoius when she does exercise. Instructed nurses to demonstrate isometric stretches for her right knee and right IT band. Refered her  for evaluation ofher right knee. Since LOV patient presents today w/ improved ankle pain. Patient has been compliant w/ her extended left SMO brace. She states she can walk now on her left ankle. Patient takes NSAIDs for occasional pain. Notes doing ankle exercises alleviates her left ankle pain. Reports she went on an hour walk w/ friends w/ minimal pain. She states she can do the elipitcal for 30 minutes 3 days a week.      Visit Vitals  Pulse 87   Resp 15   Ht 5' 7\" (1.702 m)   Wt 175 lb (79.4 kg)   SpO2 95%   BMI 27.41 kg/m²       Appearance: Alert, well appearing and pleasant patient who is in no distress, oriented to person, place/time, and who follows commands. This patient is accompanied in the examination room by her  self. There is signs of: no dementia  Psychiatric: Affect/mood are appropriate. Speech normal in context and clarity, memory intact grossly, no involuntary movements - tremors. Patient arrives to office via: with assistive device: Left Extended SMO brace  H EENT (2): Head normocephalic & atraumatic. Eye: pupils are round// EOM are intact // Neck: ROM WNL  // Hearings Intact   Respiratory: Breathing non labored     ANKLE/FOOT left    Gait: uses assistive device   Tenderness: mild ANTERIOR ANKLE   Cutaneous:   WNL. Joint Motion:   WNL  Joint / Tendon Stability: No Ankle or Subtalar instability or joint laxity. No peroneal sublux ability or dislocation  Alignment: neutral Hindfoot,    Neuro Motor/Sensory: NL/NL  Vascular: NL foot/ankle pulses,   Lymphatics: No extremity lymphedema, No calf swelling, no tenderness to calf muscles. CHART REVIEW     Sathya Vargas has been experiencing pain and discomfort confirmed as outlined in the pain assessment outlined below.  was reviewed by Unruly Pearson MD on 8/16/2021. Pain Assessment  8/16/2021   Location of Pain Ankle   Location Modifiers Left   Severity of Pain 1   Quality of Pain Dull   Quality of Pain Comment -   Duration of Pain -   Duration of Pain Comment -   Frequency of Pain Intermittent   Frequency of Pain Comment -   Date Pain First Started -   Date Pain First Started Comment -   Aggravating Factors Walking   Limiting Behavior Some   Relieving Factors -   Result of Injury -        Sathya Vargas  has a past medical history of Left ankle pain. Patients is employed at:         Past Medical History:   Diagnosis Date    Left ankle pain      History reviewed. No pertinent surgical history.   Current Outpatient Medications   Medication Sig    ibuprofen 200 mg cap Take by mouth as needed.  acetaminophen (Tylenol Extra Strength) 500 mg tablet Take  by mouth every six (6) hours as needed for Pain.  MELATONIN PO Take  by mouth.  cyclobenzaprine (FLEXERIL) 10 mg tablet Take  by mouth three (3) times daily as needed for Muscle Spasm(s).  diphenhydrAMINE-acetaminophen (Tylenol PM Extra Strength)  mg tab Take  by mouth. No current facility-administered medications for this visit. Allergies   Allergen Reactions    Codeine Unknown (comments)    Shrimp Not Reported This Time     Shrimp shells is what the patient is allergic to       Social History     Occupational History    Not on file   Tobacco Use    Smoking status: Never Smoker    Smokeless tobacco: Never Used   Substance and Sexual Activity    Alcohol use: Yes     Alcohol/week: 1.0 standard drinks     Types: 1 Glasses of wine per week     Comment: occasionally    Drug use: Never    Sexual activity: Not on file     Family History   Problem Relation Age of Onset    Diabetes Mother     Hypertension Mother     Obesity Mother     COPD Father     Cancer Father     Hypertension Father     Immunodeficiency Father         ALS        DIAGNOSTIC LAB DATA      No results found for: HBA1C, SWB0KHFQ, ETP8QWCP // No results found for: GLU, GLUCPOC     No results found for: DAJ7SESN, MOP9XAQH      No results found for: VITD3, XQVID2, XQVID3, XQVID, VD3RIA, [id number]      REVIEW OF SYSTEMS : 8/16/2021  ALL BELOW ARE Negative except : SEE HPI     All other systems reviewed and are negative. 12 point review of systems otherwise negative unless noted in HPI. DIAGNOSTIC IMAGING /ORDERS       No orders of the defined types were placed in this encounter. I have reviewed the results of the above study. The interpretation of this study is my professional opinion.             On this date 08/16/2021 I have spent 25 minutes reviewing previous notes, test results and face to face with the patient discussing the diagnosis and importance of compliance with the treatment plan as well as documenting on the day of the visit. An electronic signature was used to authenticate this note. Disclaimer: Sections of this note are dictated using utilizing voice recognition software, which may have resulted in some phonetic based errors in grammar and contents. Even though attempts were made to correct all the mistakes, some may have been missed, and remained in the body of the document. If questions arise, please contact our department. Sara Nettles may have a reminder for a \"due or due soon\" health maintenance. I have asked that she contact her primary care provider for follow-up on this health maintenance.     Tisha Aaron, as dictated by  Benigno Cameron MD  8/16/2021  1:14 PM

## 2021-08-16 ENCOUNTER — OFFICE VISIT (OUTPATIENT)
Dept: ORTHOPEDIC SURGERY | Age: 56
End: 2021-08-16
Payer: COMMERCIAL

## 2021-08-16 VITALS
HEIGHT: 67 IN | HEART RATE: 87 BPM | OXYGEN SATURATION: 95 % | RESPIRATION RATE: 15 BRPM | BODY MASS INDEX: 27.47 KG/M2 | WEIGHT: 175 LBS

## 2021-08-16 DIAGNOSIS — M19.172 POST-TRAUMATIC OSTEOARTHRITIS OF LEFT ANKLE: Primary | ICD-10-CM

## 2021-08-16 DIAGNOSIS — M19.172 POST-TRAUMATIC OSTEOARTHRITIS OF LEFT FOOT: ICD-10-CM

## 2021-08-16 PROCEDURE — 99213 OFFICE O/P EST LOW 20 MIN: CPT | Performed by: ORTHOPAEDIC SURGERY

## 2021-12-15 ENCOUNTER — TELEPHONE (OUTPATIENT)
Dept: INTERNAL MEDICINE CLINIC | Age: 56
End: 2021-12-15

## 2021-12-15 NOTE — TELEPHONE ENCOUNTER
Patient had a covid test done on Monday at [facility], she has not heard the results yet and was advised by her boss to contact Dr. Kely Renteria office on the results and when she can return to work, she stated she has been out since Friday.    [phone]

## 2021-12-15 NOTE — TELEPHONE ENCOUNTER
Called patient and she states that Barb Quintanilla called her and told her that Nicolas Hoffman told her to call Dr Cece Copeland. I notified her that there is not anything we(Dr. Cece Copeland) can do in regards to this Notified her to do whatever she has been advised to do per the [facility] Doctor.

## 2022-03-20 PROBLEM — J30.2 SEASONAL ALLERGIC RHINITIS: Status: ACTIVE | Noted: 2020-07-28

## 2023-04-20 ENCOUNTER — OFFICE VISIT (OUTPATIENT)
Facility: CLINIC | Age: 58
End: 2023-04-20
Payer: COMMERCIAL

## 2023-04-20 VITALS
BODY MASS INDEX: 26.22 KG/M2 | DIASTOLIC BLOOD PRESSURE: 64 MMHG | HEART RATE: 108 BPM | SYSTOLIC BLOOD PRESSURE: 105 MMHG | TEMPERATURE: 99.6 F | RESPIRATION RATE: 16 BRPM | HEIGHT: 68 IN | OXYGEN SATURATION: 94 % | WEIGHT: 173 LBS

## 2023-04-20 DIAGNOSIS — R09.82 POSTNASAL DRIP: ICD-10-CM

## 2023-04-20 DIAGNOSIS — J22 ACUTE RESPIRATORY INFECTION: Primary | ICD-10-CM

## 2023-04-20 DIAGNOSIS — R05.1 ACUTE COUGH: ICD-10-CM

## 2023-04-20 PROCEDURE — 99202 OFFICE O/P NEW SF 15 MIN: CPT | Performed by: NURSE PRACTITIONER

## 2023-04-20 RX ORDER — AMOXICILLIN AND CLAVULANATE POTASSIUM 875; 125 MG/1; MG/1
1 TABLET, FILM COATED ORAL 2 TIMES DAILY
Qty: 14 TABLET | Refills: 0 | Status: SHIPPED | OUTPATIENT
Start: 2023-04-20 | End: 2023-04-27

## 2023-04-20 RX ORDER — BENZONATATE 100 MG/1
100-200 CAPSULE ORAL 3 TIMES DAILY PRN
Qty: 60 CAPSULE | Refills: 0 | Status: SHIPPED | OUTPATIENT
Start: 2023-04-20 | End: 2023-04-27

## 2023-04-20 SDOH — ECONOMIC STABILITY: HOUSING INSECURITY
IN THE LAST 12 MONTHS, WAS THERE A TIME WHEN YOU DID NOT HAVE A STEADY PLACE TO SLEEP OR SLEPT IN A SHELTER (INCLUDING NOW)?: NO

## 2023-04-20 SDOH — ECONOMIC STABILITY: FOOD INSECURITY: WITHIN THE PAST 12 MONTHS, YOU WORRIED THAT YOUR FOOD WOULD RUN OUT BEFORE YOU GOT MONEY TO BUY MORE.: NEVER TRUE

## 2023-04-20 SDOH — ECONOMIC STABILITY: FOOD INSECURITY: WITHIN THE PAST 12 MONTHS, THE FOOD YOU BOUGHT JUST DIDN'T LAST AND YOU DIDN'T HAVE MONEY TO GET MORE.: NEVER TRUE

## 2023-04-20 SDOH — ECONOMIC STABILITY: INCOME INSECURITY: HOW HARD IS IT FOR YOU TO PAY FOR THE VERY BASICS LIKE FOOD, HOUSING, MEDICAL CARE, AND HEATING?: NOT HARD AT ALL

## 2023-04-20 ASSESSMENT — PATIENT HEALTH QUESTIONNAIRE - PHQ9
1. LITTLE INTEREST OR PLEASURE IN DOING THINGS: 0
2. FEELING DOWN, DEPRESSED OR HOPELESS: 0
SUM OF ALL RESPONSES TO PHQ QUESTIONS 1-9: 0
SUM OF ALL RESPONSES TO PHQ9 QUESTIONS 1 & 2: 0
SUM OF ALL RESPONSES TO PHQ QUESTIONS 1-9: 0

## 2023-04-20 NOTE — PROGRESS NOTES
Randolph Velez (: 1965) is a 62 y.o. female patient, here for evaluation of the following chief complaint(s):  Establish Care (Establish care with provider ) and Cough (Has a cough, drainage, and headache has a little congestion in her chest for a week now )       ASSESSMENT/PLAN:  Below is the assessment and plan developed based on review of pertinent history, physical exam, labs, studies, and medications. Augmentin as prescribed  Tessalon as prescribed  Recommended over-the-counter simply saline, Xyzal, Flonase. No work until Monday  Patient should follow-up sooner if needed    1. Acute respiratory infection  2. Postnasal drip  3. Acute cough    No results found for any visits on 23. No follow-ups on file. I have discussed the diagnosis with the patient and the intended plan as seen in the above orders. The patient has received an after-visit summary and questions were answered concerning future plans. I have discussed medication side effects and warnings with the patient as well. I have reviewed the plan of care with the patient, accepted their input and they are in agreement with the treatment goals. Previous lab and imaging results were reviewed by me. I spent 15 minutes with this patient performing a medically appropriate exam, ordering tests and medications, counseling and educating, and documenting in the EMR       Past Medical History  Past Medical History:   Diagnosis Date    Left ankle pain           SUBJECTIVE/OBJECTIVE:    HPI: 49-year-old female presents to the office with approximately 1 week of respiratory symptoms. She states it started with a sore throat which has been mildly improved. She now has a cough, postnasal drip and sinus drainage, just a general feeling of not being rundown. She did a COVID test Monday which was negative.   There are no reports of fever, weakness, inability to eat or drink, chills, wheezing, shortness of

## 2023-04-20 NOTE — PROGRESS NOTES
Richard Camarena presents today for   Chief Complaint   Patient presents with    Establish Care     Establish care with provider     Cough     Has a cough, drainage, and headache has a little congestion in her chest for a week now        Is someone accompanying this pt? No     Is the patient using any DME equipment during OV? No     Health Maintenance reviewed and discussed and ordered per Provider. Health Maintenance Due   Topic Date Due    COVID-19 Vaccine (1) Never done    HIV screen  Never done    Hepatitis C screen  Never done    DTaP/Tdap/Td vaccine (1 - Tdap) Never done    Cervical cancer screen  Never done    Diabetes screen  Never done    Lipids  Never done    Colorectal Cancer Screen  Never done    Breast cancer screen  Never done    Shingles vaccine (1 of 2) Never done    Depression Screen  08/16/2022   .          1. \"Have you been to the ER, urgent care clinic since your last visit? Hospitalized since your last visit? \" No    2. \"Have you seen or consulted any other health care providers outside of the [address] [place] [address] since your last visit? \" Yes When: 2022 Where: [address] [facility]       3. For patients aged 39-70: Has the patient had a colonoscopy / FIT/ Cologuard? Yes - Care Gap present. Rooming MA/LPN to request most recent results      If the patient is female:    4. For patients aged 41-77: Has the patient had a mammogram within the past 2 years? Yes - Care Gap present. Rooming MA/LPN to request most recent results      5. For patients aged 21-65: Has the patient had a pap smear? Yes - Care Gap present.  Rooming MA/LPN to request most recent results